# Patient Record
Sex: MALE | Race: BLACK OR AFRICAN AMERICAN | NOT HISPANIC OR LATINO
[De-identification: names, ages, dates, MRNs, and addresses within clinical notes are randomized per-mention and may not be internally consistent; named-entity substitution may affect disease eponyms.]

---

## 2018-11-07 PROBLEM — Z00.00 ENCOUNTER FOR PREVENTIVE HEALTH EXAMINATION: Status: ACTIVE | Noted: 2018-11-07

## 2018-11-16 ENCOUNTER — APPOINTMENT (OUTPATIENT)
Dept: UROLOGY | Facility: CLINIC | Age: 83
End: 2018-11-16
Payer: MEDICARE

## 2018-11-16 VITALS
RESPIRATION RATE: 16 BRPM | HEART RATE: 73 BPM | HEIGHT: 68 IN | SYSTOLIC BLOOD PRESSURE: 150 MMHG | BODY MASS INDEX: 19.1 KG/M2 | DIASTOLIC BLOOD PRESSURE: 71 MMHG | TEMPERATURE: 97.7 F | WEIGHT: 126 LBS

## 2018-11-16 DIAGNOSIS — Z63.4 DISAPPEARANCE AND DEATH OF FAMILY MEMBER: ICD-10-CM

## 2018-11-16 DIAGNOSIS — N40.2 NODULAR PROSTATE W/OUT LOWER URINARY TRACT SYMPTOMS: ICD-10-CM

## 2018-11-16 PROCEDURE — 99213 OFFICE O/P EST LOW 20 MIN: CPT

## 2018-11-16 SDOH — SOCIAL STABILITY - SOCIAL INSECURITY: DISSAPEARANCE AND DEATH OF FAMILY MEMBER: Z63.4

## 2020-02-28 ENCOUNTER — APPOINTMENT (OUTPATIENT)
Dept: UROLOGY | Facility: CLINIC | Age: 85
End: 2020-02-28

## 2020-11-11 ENCOUNTER — EMERGENCY (EMERGENCY)
Facility: HOSPITAL | Age: 85
LOS: 1 days | Discharge: HOME | End: 2020-11-11
Attending: EMERGENCY MEDICINE
Payer: COMMERCIAL

## 2020-11-11 ENCOUNTER — TRANSCRIPTION ENCOUNTER (OUTPATIENT)
Age: 85
End: 2020-11-11

## 2020-11-11 VITALS
SYSTOLIC BLOOD PRESSURE: 187 MMHG | OXYGEN SATURATION: 98 % | HEART RATE: 70 BPM | DIASTOLIC BLOOD PRESSURE: 89 MMHG | TEMPERATURE: 98 F | RESPIRATION RATE: 16 BRPM | HEIGHT: 68 IN | WEIGHT: 130.07 LBS

## 2020-11-11 VITALS
DIASTOLIC BLOOD PRESSURE: 84 MMHG | SYSTOLIC BLOOD PRESSURE: 144 MMHG | HEART RATE: 63 BPM | TEMPERATURE: 98 F | OXYGEN SATURATION: 100 % | RESPIRATION RATE: 18 BRPM

## 2020-11-11 LAB
ALBUMIN SERPL ELPH-MCNC: 4.2 G/DL — SIGNIFICANT CHANGE UP (ref 3.3–5)
ALP SERPL-CCNC: 84 U/L — SIGNIFICANT CHANGE UP (ref 40–120)
ALT FLD-CCNC: 16 U/L — SIGNIFICANT CHANGE UP (ref 10–45)
ANION GAP SERPL CALC-SCNC: 12 MMOL/L — SIGNIFICANT CHANGE UP (ref 5–17)
AST SERPL-CCNC: 36 U/L — SIGNIFICANT CHANGE UP (ref 10–40)
BILIRUB SERPL-MCNC: 0.5 MG/DL — SIGNIFICANT CHANGE UP (ref 0.2–1.2)
BUN SERPL-MCNC: 26 MG/DL — HIGH (ref 7–23)
CALCIUM SERPL-MCNC: 10.3 MG/DL — SIGNIFICANT CHANGE UP (ref 8.4–10.5)
CHLORIDE SERPL-SCNC: 101 MMOL/L — SIGNIFICANT CHANGE UP (ref 96–108)
CO2 SERPL-SCNC: 25 MMOL/L — SIGNIFICANT CHANGE UP (ref 22–31)
CREAT SERPL-MCNC: 1.37 MG/DL — HIGH (ref 0.5–1.3)
GLUCOSE SERPL-MCNC: 116 MG/DL — HIGH (ref 70–99)
HCT VFR BLD CALC: 47.8 % — SIGNIFICANT CHANGE UP (ref 39–50)
HGB BLD-MCNC: 15.6 G/DL — SIGNIFICANT CHANGE UP (ref 13–17)
MCHC RBC-ENTMCNC: 30.4 PG — SIGNIFICANT CHANGE UP (ref 27–34)
MCHC RBC-ENTMCNC: 32.6 GM/DL — SIGNIFICANT CHANGE UP (ref 32–36)
MCV RBC AUTO: 93 FL — SIGNIFICANT CHANGE UP (ref 80–100)
NRBC # BLD: 0 /100 WBCS — SIGNIFICANT CHANGE UP (ref 0–0)
PLATELET # BLD AUTO: 199 K/UL — SIGNIFICANT CHANGE UP (ref 150–400)
POTASSIUM SERPL-MCNC: 4.3 MMOL/L — SIGNIFICANT CHANGE UP (ref 3.5–5.3)
POTASSIUM SERPL-SCNC: 4.3 MMOL/L — SIGNIFICANT CHANGE UP (ref 3.5–5.3)
PROT SERPL-MCNC: 7.4 G/DL — SIGNIFICANT CHANGE UP (ref 6–8.3)
RBC # BLD: 5.14 M/UL — SIGNIFICANT CHANGE UP (ref 4.2–5.8)
RBC # FLD: 12.7 % — SIGNIFICANT CHANGE UP (ref 10.3–14.5)
SODIUM SERPL-SCNC: 138 MMOL/L — SIGNIFICANT CHANGE UP (ref 135–145)
WBC # BLD: 4.35 K/UL — SIGNIFICANT CHANGE UP (ref 3.8–10.5)
WBC # FLD AUTO: 4.35 K/UL — SIGNIFICANT CHANGE UP (ref 3.8–10.5)

## 2020-11-11 PROCEDURE — 99284 EMERGENCY DEPT VISIT MOD MDM: CPT | Mod: 25

## 2020-11-11 PROCEDURE — 73610 X-RAY EXAM OF ANKLE: CPT | Mod: 26,RT

## 2020-11-11 PROCEDURE — 85027 COMPLETE CBC AUTOMATED: CPT

## 2020-11-11 PROCEDURE — 73610 X-RAY EXAM OF ANKLE: CPT

## 2020-11-11 PROCEDURE — 93005 ELECTROCARDIOGRAM TRACING: CPT

## 2020-11-11 PROCEDURE — 80053 COMPREHEN METABOLIC PANEL: CPT

## 2020-11-11 PROCEDURE — 73590 X-RAY EXAM OF LOWER LEG: CPT | Mod: 26,RT

## 2020-11-11 PROCEDURE — 90715 TDAP VACCINE 7 YRS/> IM: CPT

## 2020-11-11 PROCEDURE — 73630 X-RAY EXAM OF FOOT: CPT | Mod: 26,RT

## 2020-11-11 PROCEDURE — 99284 EMERGENCY DEPT VISIT MOD MDM: CPT

## 2020-11-11 PROCEDURE — 90471 IMMUNIZATION ADMIN: CPT

## 2020-11-11 PROCEDURE — 73630 X-RAY EXAM OF FOOT: CPT

## 2020-11-11 PROCEDURE — 73590 X-RAY EXAM OF LOWER LEG: CPT

## 2020-11-11 RX ORDER — TETANUS TOXOID, REDUCED DIPHTHERIA TOXOID AND ACELLULAR PERTUSSIS VACCINE, ADSORBED 5; 2.5; 8; 8; 2.5 [IU]/.5ML; [IU]/.5ML; UG/.5ML; UG/.5ML; UG/.5ML
0.5 SUSPENSION INTRAMUSCULAR ONCE
Refills: 0 | Status: DISCONTINUED | OUTPATIENT
Start: 2020-11-11 | End: 2020-11-11

## 2020-11-11 RX ORDER — ACETAMINOPHEN 500 MG
650 TABLET ORAL ONCE
Refills: 0 | Status: COMPLETED | OUTPATIENT
Start: 2020-11-11 | End: 2020-11-11

## 2020-11-11 RX ORDER — TETANUS TOXOID, REDUCED DIPHTHERIA TOXOID AND ACELLULAR PERTUSSIS VACCINE, ADSORBED 5; 2.5; 8; 8; 2.5 [IU]/.5ML; [IU]/.5ML; UG/.5ML; UG/.5ML; UG/.5ML
0.5 SUSPENSION INTRAMUSCULAR ONCE
Refills: 0 | Status: COMPLETED | OUTPATIENT
Start: 2020-11-11 | End: 2020-11-11

## 2020-11-11 RX ADMIN — Medication 650 MILLIGRAM(S): at 18:56

## 2020-11-11 RX ADMIN — TETANUS TOXOID, REDUCED DIPHTHERIA TOXOID AND ACELLULAR PERTUSSIS VACCINE, ADSORBED 0.5 MILLILITER(S): 5; 2.5; 8; 8; 2.5 SUSPENSION INTRAMUSCULAR at 19:35

## 2020-11-11 RX ADMIN — Medication 650 MILLIGRAM(S): at 17:54

## 2020-11-11 NOTE — ED PROVIDER NOTE - PROGRESS NOTE DETAILS
Blade Bonds, PGY2: XR showing medial mal fracture. Ortho paged and will see in ED. Blade Bonds, PGY2: Ankle splinted by ortho. Discussed results and follow up w/ Daughter Candie ([phone]). Discussed return precautions and all questions answered. Pt in agreement w/ plan. CAOx3, NAD, VSS. Stable for d/c.
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
## 2020-11-11 NOTE — ED PROVIDER NOTE - CARE PROVIDER_API CALL
José Singh)  Orthopaedic Surgery  [address] [place] [address], [address]  [place] [address]  Phone: [phone]  Fax: [phone]  Follow Up Time: 1-3 Days

## 2020-11-11 NOTE — ED PROVIDER NOTE - CLINICAL SUMMARY MEDICAL DECISION MAKING FREE TEXT BOX
Pt with previous xrays showing right medial malleolus fracture.   Preop labs, xrays, and ortho consult.

## 2020-11-11 NOTE — ED PROVIDER NOTE - NSFOLLOWUPINSTRUCTIONS_ED_ALL_ED_FT
You were seen in the ER for ankle pain. Your x-rays demonstrated a fracture of your right ankle. This will likely require surgery for definitive treatment. Please follow up with Dr. Singh in his office on Friday. Call the number above to schedule an appointment and tell them you were seen at [facility].     Rest, ice, and elevate the affected extremity. Apply ice to the area for 20 minutes every 2 hours for the first 2 days after injury. You can expect any swelling or bruising to get a little worse in the next few days, but if you have markedly increasing pain or swelling, or any numbness, changes in sensation, weakness or any other concerns please return to the emergency department immediately. Do not put anything inside your splint, such as to itch your leg. Keep dressing clean and dry.     Take Tylenol 650mg (Two 325 mg pills) every 4-6 hours as needed for pain.     Rest, drink plenty of fluids.  Advance activity as tolerated.  Continue all previously prescribed medications as directed.  Follow up with your primary care physician in 48-72 hours- bring copies of your results.  Return to the ER for worsening or persistent symptoms, and/or ANY NEW OR CONCERNING SYMPTOMS. If you have issues obtaining follow up, please call: [phone] to obtain a doctor or specialist who takes your insurance in your area.
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
## 2020-11-11 NOTE — ED PROVIDER NOTE - PATIENT PORTAL LINK FT
You can access the FollowMyHealth Patient Portal offered by [facility] by registering at the following website: [email or web address]. By joining [employer]’s FollowMyHealth portal, you will also be able to view your health information using other applications (apps) compatible with our system.

## 2020-11-11 NOTE — ED PROVIDER NOTE - LOCATION
ankle Abrasion to the right lateral malleolus. 2+ distal DP pulses. Sensation intact. Pt able to wiggle toes. TTP medial malleolus. No tenderness to base of 5th metatarsal. No proximal fibula tenderness. FROM of right knee. FROM of right hip. Limited ROM to right ankle 2/2 pain and swelling./ankle
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
## 2020-11-11 NOTE — ED PROVIDER NOTE - CARE PLAN
Principal Discharge DX:	Closed displaced fracture of medial malleolus of right tibia, initial encounter
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
## 2020-11-11 NOTE — ED ADULT NURSE NOTE - NSIMPLEMENTINTERV_GEN_ALL_ED
Implemented All Fall with Harm Risk Interventions:  [place] to call system. Call bell, personal items and telephone within reach. Instruct patient to call for assistance. Room bathroom lighting operational. Non-slip footwear when patient is off stretcher. Physically safe environment: no spills, clutter or unnecessary equipment. Stretcher in lowest position, wheels locked, appropriate side rails in place. Provide visual cue, wrist band, yellow gown, etc. Monitor gait and stability. Monitor for mental status changes and reorient to person, place, and time. Review medications for side effects contributing to fall risk. Reinforce activity limits and safety measures with patient and family. Provide visual clues: red socks.
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
## 2020-11-12 NOTE — CONSULT NOTE ADULT - ASSESSMENT
A/P:  86 yo M s/p right medial malleolus ankle fracture:  -NWB RLE in trilam splint, with crutches  -pain control  -discussed need for surgical fixation of the right medial malleolus fracture, advised patient to be admitted for surgical fixation, pt stated he would like to follow up in the office  -Pt aware and understands that he needs to follow up in the office for surgical planning  -keep splint CDI  -educated patient and pt daughter in law on keeping the RLE elevated and iced  -signs and sx of compartment syndrome explained and discussed, advised to return to ED if any were to present  -ortho stable for DC  -FU with Dr. Singh in office on Friday 11/13, call office for an appt  -Discussed with Dr. Singh, aware and in agreement with above plan

## 2021-05-27 PROBLEM — Z78.9 OTHER SPECIFIED HEALTH STATUS: Chronic | Status: ACTIVE | Noted: 2020-11-13

## 2021-06-09 NOTE — ED ADULT NURSE NOTE - NS ED NOTE ABUSE SUSPICION NEGLECT YN
continue home med of allopurinol and colchicine continue home med of allopurinol and colchicine continue home med of allopurinol and colchicine No continue home med of allopurinol and colchicine continue home med of allopurinol and colchicine continue home med of allopurinol and colchicine

## 2021-06-24 ENCOUNTER — APPOINTMENT (OUTPATIENT)
Dept: UROLOGY | Facility: CLINIC | Age: 86
End: 2021-06-24

## 2021-08-19 ENCOUNTER — APPOINTMENT (OUTPATIENT)
Dept: UROLOGY | Facility: CLINIC | Age: 86
End: 2021-08-19
Payer: MEDICARE

## 2021-08-19 VITALS
TEMPERATURE: 96.6 F | WEIGHT: 138 LBS | SYSTOLIC BLOOD PRESSURE: 139 MMHG | HEIGHT: 68 IN | BODY MASS INDEX: 20.92 KG/M2 | DIASTOLIC BLOOD PRESSURE: 84 MMHG | HEART RATE: 78 BPM

## 2021-08-19 PROCEDURE — 99214 OFFICE O/P EST MOD 30 MIN: CPT

## 2021-09-25 ENCOUNTER — RESULT REVIEW (OUTPATIENT)
Age: 86
End: 2021-09-25

## 2021-09-25 ENCOUNTER — OUTPATIENT (OUTPATIENT)
Dept: OUTPATIENT SERVICES | Facility: HOSPITAL | Age: 86
LOS: 1 days | End: 2021-09-25
Payer: COMMERCIAL

## 2021-09-25 ENCOUNTER — APPOINTMENT (OUTPATIENT)
Dept: MRI IMAGING | Facility: IMAGING CENTER | Age: 86
End: 2021-09-25
Payer: MEDICARE

## 2021-09-25 DIAGNOSIS — R97.20 ELEVATED PROSTATE SPECIFIC ANTIGEN [PSA]: ICD-10-CM

## 2021-09-25 PROCEDURE — A9585: CPT

## 2021-09-25 PROCEDURE — 72197 MRI PELVIS W/O & W/DYE: CPT

## 2021-09-25 PROCEDURE — 72197 MRI PELVIS W/O & W/DYE: CPT | Mod: 26

## 2021-09-25 PROCEDURE — 76498 UNLISTED MR PROCEDURE: CPT

## 2021-09-25 PROCEDURE — 76498P: CUSTOM | Mod: 26

## 2021-10-10 ENCOUNTER — NON-APPOINTMENT (OUTPATIENT)
Age: 86
End: 2021-10-10

## 2022-05-31 ENCOUNTER — APPOINTMENT (OUTPATIENT)
Dept: UROLOGY | Facility: CLINIC | Age: 87
End: 2022-05-31
Payer: MEDICARE

## 2022-05-31 DIAGNOSIS — N40.0 BENIGN PROSTATIC HYPERPLASIA WITHOUT LOWER URINARY TRACT SYMPMS: ICD-10-CM

## 2022-05-31 DIAGNOSIS — R97.20 ELEVATED PROSTATE, SPECIFIC ANTIGEN [PSA]: ICD-10-CM

## 2022-05-31 PROCEDURE — 99213 OFFICE O/P EST LOW 20 MIN: CPT

## 2022-05-31 RX ORDER — LATANOPROST/PF 0.005 %
0.01 DROPS OPHTHALMIC (EYE)
Qty: 2 | Refills: 0 | Status: ACTIVE | COMMUNITY
Start: 2022-05-24

## 2022-05-31 RX ORDER — AMLODIPINE BESYLATE 10 MG/1
10 TABLET ORAL
Qty: 90 | Refills: 0 | Status: ACTIVE | COMMUNITY
Start: 2022-05-23

## 2022-05-31 RX ORDER — DONEPEZIL HYDROCHLORIDE 5 MG/1
5 TABLET ORAL
Qty: 30 | Refills: 0 | Status: DISCONTINUED | COMMUNITY
Start: 2022-03-14

## 2022-06-05 NOTE — HISTORY OF PRESENT ILLNESS
[FreeTextEntry1] : Gabriella Macias MD\par [address] [facility]\[place] [address]\par \par Dear Dr. Macias,\par \par Dung Jones returns to the office today.  He is an 87-year-old man who had been referred to me for evaluation of PSA elevation.  His PSA last year had been 18.6 ng/mL.  He had a prostate MRI done last year which showed a prostate volume of 63 cm³.  A PI-RADS category 3 lesion was detected which carries equivocal risk of harboring clinically significant prostate cancer.  We had discussed the potential for biopsy but I had some concerns that diagnosis and treatment of prostate cancer may not necessarily hold benefit for him given his advanced age.  We elected to observe him and then consider potential biopsy if his PSA were to increase more significantly.  He is now back today for follow-up.\par \par He did have his PSA recently rechecked which was slightly down to 15.7 ng/mL from 18.6 last year.  While the decrease does not rule out prostate cancer, it is reassuring that his PSA has not risen more significantly.  I would continue to advocate here for observation rather than pursuit of biopsy and/or prostate cancer treatment unless there is additional clear cause that he should benefit from it.\par \par For now I would recommend that he have a PSA checked again in 6 to 12 months but I do not think he needs any further immediate urologic intervention.\par \par Sincerely,\par \par \par \par \par Christian Person MD, FACS\par Chief of Urology, [facility]\par  of Urology\par Director of Robotic and Laparoscopic Surgery\par [facility]\par \par [facility]\par [address] [place] [address], [facility]\[place] [address]\par P: [phone]\par F: [phone]\par [facility]

## 2025-02-25 NOTE — ED PROVIDER NOTE - CPE EDP CARDIAC NORM
02/25/25 1031   Right Leg Edema Point of Measurement   Leg circumference 36 cm   Ankle circumference 20 cm   Foot circumference 25 cm   Compression Therapy Tubular elastic support bandage   Wound 10/03/24 Foot Right;Plantar #1   Date First Assessed/Time First Assessed: 10/03/24 1329   Present on Original Admission: Yes  Wound Approximate Age at First Assessment (Weeks): 6 weeks  Primary Wound Type: Diabetic Ulcer  Location: Foot  Wound Location Orientation: Right;Plantar  Wou...   Wound Image     Wound Etiology Diabetic [place] 1   Dressing Status Intact   Wound Cleansed Cleansed with saline;Vashe   Dressing/Treatment Hydrofera blue;Collagen   Offloading for Diabetic Foot Ulcers Post op shoe   Wound Length (cm) 0.6 cm   Wound Width (cm) 0.4 cm   Wound Depth (cm) 0.8 cm   Wound Surface Area (cm^2) 0.24 cm^2   Change in Wound Size % (l*w) -50   Wound Volume (cm^3) 0.192 cm^3   Wound Healing % -100   Post-Procedure Length (cm) 1 cm   Post-Procedure Width (cm) 0.5 cm   Post-Procedure Depth (cm) 0.6 cm   Post-Procedure Surface Area (cm^2) 0.5 cm^2   Post-Procedure Volume (cm^3) 0.3 cm^3   Undermining Starts ___ O'Clock 12   Undermining Ends___ O'Clock 12   Undermining [place] Distance (cm) 0.3   Wound Assessment [place]/red;Slough   Drainage Amount None (dry)   Odor None   [place]-wound Assessment Hyperkeratosis (callous)   Margins Undefined edges   Wound Thickness Description not for Pressure Injury Full thickness   Pain Assessment   Pain Assessment None - Denies Pain       
normal...